# Patient Record
Sex: FEMALE | Race: WHITE | NOT HISPANIC OR LATINO | URBAN - METROPOLITAN AREA
[De-identification: names, ages, dates, MRNs, and addresses within clinical notes are randomized per-mention and may not be internally consistent; named-entity substitution may affect disease eponyms.]

---

## 2024-09-17 ENCOUNTER — APPOINTMENT (RX ONLY)
Dept: URBAN - METROPOLITAN AREA CLINIC 166 | Facility: CLINIC | Age: 59
Setting detail: DERMATOLOGY
End: 2024-09-17

## 2024-09-17 DIAGNOSIS — D49.2 NEOPLASM OF UNSPECIFIED BEHAVIOR OF BONE, SOFT TISSUE, AND SKIN: ICD-10-CM

## 2024-09-17 DIAGNOSIS — L28.1 PRURIGO NODULARIS: ICD-10-CM | Status: INADEQUATELY CONTROLLED

## 2024-09-17 PROCEDURE — 11104 PUNCH BX SKIN SINGLE LESION: CPT

## 2024-09-17 PROCEDURE — ? COUNSELING

## 2024-09-17 PROCEDURE — ? BIOPSY BY PUNCH METHOD

## 2024-09-17 PROCEDURE — 99203 OFFICE O/P NEW LOW 30 MIN: CPT | Mod: 25

## 2024-09-17 ASSESSMENT — LOCATION DETAILED DESCRIPTION DERM
LOCATION DETAILED: LEFT MID-UPPER BACK
LOCATION DETAILED: RIGHT PROXIMAL DORSAL FOREARM
LOCATION DETAILED: LEFT PROXIMAL DORSAL FOREARM

## 2024-09-17 ASSESSMENT — LOCATION ZONE DERM
LOCATION ZONE: TRUNK
LOCATION ZONE: ARM

## 2024-09-17 ASSESSMENT — LOCATION SIMPLE DESCRIPTION DERM
LOCATION SIMPLE: RIGHT FOREARM
LOCATION SIMPLE: LEFT UPPER BACK
LOCATION SIMPLE: LEFT FOREARM

## 2024-09-17 NOTE — HPI: DISCOLORATION
How Severe Is Your Skin Discoloration?: mild
Additional History: Primary did labs and only abnormality was calcium and seeing rheumatologist in Oct for arthritis

## 2024-09-17 NOTE — PROCEDURE: COUNSELING
Detail Level: Detailed
Patient Specific Counseling (Will Not Stick From Patient To Patient): - Pt admits to picking at lesions creating prurigo nodules

## 2024-09-17 NOTE — HPI: SKIN LESION
Is This A New Presentation, Or A Follow-Up?: Skin Lesions
How Severe Is Your Skin Lesion?: mild
Has Your Skin Lesion Been Treated?: not been treated
What Type Of Note Output Would You Prefer (Optional)?: Bullet Format
Additional History: Pt admits to picking

## 2024-10-02 ENCOUNTER — APPOINTMENT (RX ONLY)
Dept: URBAN - METROPOLITAN AREA CLINIC 166 | Facility: CLINIC | Age: 59
Setting detail: DERMATOLOGY
End: 2024-10-02

## 2024-10-02 ENCOUNTER — RX ONLY (OUTPATIENT)
Age: 59
Setting detail: RX ONLY
End: 2024-10-02

## 2024-10-02 DIAGNOSIS — Z48.817 ENCOUNTER FOR SURGICAL AFTERCARE FOLLOWING SURGERY ON THE SKIN AND SUBCUTANEOUS TISSUE: ICD-10-CM

## 2024-10-02 PROCEDURE — ? SUTURE REMOVAL (GLOBAL PERIOD)

## 2024-10-02 RX ORDER — TRIAMCINOLONE ACETONIDE 1 MG/G
CREAM TOPICAL
Qty: 454 | Refills: 1 | Status: ERX | COMMUNITY
Start: 2024-10-02

## 2024-10-02 ASSESSMENT — LOCATION SIMPLE DESCRIPTION DERM: LOCATION SIMPLE: LEFT UPPER BACK

## 2024-10-02 ASSESSMENT — LOCATION ZONE DERM: LOCATION ZONE: TRUNK

## 2024-10-02 ASSESSMENT — LOCATION DETAILED DESCRIPTION DERM: LOCATION DETAILED: LEFT MID-UPPER BACK

## 2024-10-02 NOTE — PROCEDURE: SUTURE REMOVAL (GLOBAL PERIOD)
Detail Level: Detailed
Add 60962 Cpt? (Important Note: In 2017 The Use Of 67320 Is Being Tracked By Cms To Determine Future Global Period Reimbursement For Global Periods): no
Suture Removal Completed By (Optional): Reshma BAIRD